# Patient Record
Sex: MALE | Race: WHITE | NOT HISPANIC OR LATINO | Employment: FULL TIME | URBAN - METROPOLITAN AREA
[De-identification: names, ages, dates, MRNs, and addresses within clinical notes are randomized per-mention and may not be internally consistent; named-entity substitution may affect disease eponyms.]

---

## 2018-12-10 NOTE — PROGRESS NOTES
[facility] at [facility]  Consultation H&P  Silverio Ariza  1980  [unfilled]  [unfilled]  VISIT DATE:  12/11/18    PCP: Trung Adan MD  [address] [place], [place] [address]JUAN FITCH  [facility] [address]    IDENTIFICATION: A 38 y.o. male from [place], [place]. Works desk job in construction.     CC:  Chief Complaint   Patient presents with   • Chest Pain   • Edema       PROBLEM LIST:  1. CP  2. HTN  1. Cough on lisinopril  3. HLD  1. 11/21/18  TG 61 HDL 68 LDL 67, on atorvastatin  4. Former tobacco abuse  1. Cessation 3/2018, 2 PPD x 24 years  5. Morbid obesity  6. Hx of pneumonia w pneumothorax when 8 years old, hospitalized for a few months   7. R knee torn meniscus  8. Surgical history:  1. Inguinal hernia repair 2003      Allergies  No Known Allergies    Current Medications    Current Outpatient Medications:   •  aspirin 81 MG tablet, Take 81 mg by mouth Daily., Disp: , Rfl:   •  atorvastatin (LIPITOR) 10 MG tablet, Take 10 mg by mouth Daily., Disp: , Rfl:   •  losartan (COZAAR) 25 MG tablet, Take 25 mg by mouth Daily., Disp: , Rfl:   •  meloxicam (MOBIC) 15 MG tablet, Take 15 mg by mouth Daily., Disp: , Rfl:   •  methylPREDNISolone (MEDROL) 4 MG tablet, Take 4 mg by mouth Daily., Disp: , Rfl:   •  nitroglycerin (NITROSTAT) 0.4 MG SL tablet, Place 0.4 mg under the tongue Every 5 (Five) Minutes As Needed for Chest Pain. Take no more than 3 doses in 15 minutes., Disp: , Rfl:   •  Turmeric 500 MG capsule, Take 1 capsule by mouth Daily., Disp: , Rfl:      History of Present Illness   HPI  This is a 38-year-old male with the above-mentioned PMH who presents for consult for evaluation of CP and HTN.    Pt reports in September he noticed some non radiating substernal chest tightness that occurred during moments of stress. He was at work at rest and states it was not severe and lasted for a few seconds.  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 He denies any associated nausea, diaphoresis, palpitations, presyncope. After that he went to the ED in [place] at which time EKG and cardiac enzymes were normal.      He then established w a PCP and was started on losartan and atorvastatin. His BP at home had been 12-s-140s/80s-90s.  He has not had any further episodes of chest pain.  He was given nitroglycerin at his PCPs but has not had to use it. He has never had a stress test. His wife reports he snores and she has witnessed apneic spells. He has not been evaluated for NUPUR.    Pt denies dyspnea at rest , orthopnea, palpitations, lower extremity edema, or claudication. Pt denies history of CHF, DVT, PE, MI, CVA, TIA, or rheumatic fever. He denies family hx of premature CAD, CVAs, or aneurysms.       ROS  Review of Systems   Cardiovascular: Positive for chest pain and leg swelling.   Respiratory: Positive for cough.    All other systems reviewed and are negative.      SOCIAL HX  Social History     Socioeconomic History   • Marital status:      Spouse name: Not on file   • Number of children: Not on file   • Years of education: Not on file   • Highest education level: Not on file   Social Needs   • Financial resource strain: Not on file   • Food insecurity - worry: Not on file   • Food insecurity - inability: Not on file   • Transportation needs - medical: Not on file   • Transportation needs - non-medical: Not on file   Occupational History   • Not on file   Tobacco Use   • Smoking status: Former Smoker     Types: Cigarettes     Last attempt to quit: 1995     Years since quittin.0   • Smokeless tobacco: Never Used   Substance and Sexual Activity   • Alcohol use: Yes     Frequency: Never   • Drug use: No   • Sexual activity: Defer   Other Topics Concern   • Not on file   Social  "History Narrative   • Not on file       FAMILY HX  Family History   Problem Relation Age of Onset   • No Known Problems Mother    • No Known Problems Father        Vitals:    12/11/18 1334   BP: 136/88   BP Location: Left arm   Patient Position: Sitting   Pulse: 82   SpO2: 96%   Weight: (!) 193 kg (425 lb 3.2 oz)   Height: 190.5 cm (75\")       PHYSICAL EXAMINATION:  Physical Exam   Constitutional: He is oriented to person, place, and time. He appears well-developed and well-nourished. No distress.   obese   HENT:   Head: Normocephalic and atraumatic.   Right Ear: External ear normal.   Left Ear: External ear normal.   Nose: Nose normal.   Eyes: Conjunctivae and EOM are normal.   Neck: Neck supple. No hepatojugular reflux and no JVD present. Carotid bruit is not present. No thyromegaly present.   Cardiovascular: Normal rate, regular rhythm, S1 normal, S2 normal, normal heart sounds, intact distal pulses and normal pulses. Exam reveals no gallop, no distant heart sounds and no midsystolic click.   No murmur heard.  Pulses:       Radial pulses are 2+ on the right side, and 2+ on the left side.        Dorsalis pedis pulses are 2+ on the right side, and 2+ on the left side.        Posterior tibial pulses are 2+ on the right side, and 2+ on the left side.   Pulmonary/Chest: Effort normal and breath sounds normal. No respiratory distress. He has no decreased breath sounds. He has no wheezes. He has no rhonchi. He has no rales.   Abdominal: Soft. Bowel sounds are normal. There is no hepatosplenomegaly. There is no tenderness.   Musculoskeletal: Normal range of motion. He exhibits no edema.   Some varicosities    Neurological: He is alert and oriented to person, place, and time.   No focal deficits.   Skin: Skin is warm and dry. No erythema.   Psychiatric: He has a normal mood and affect. Thought content normal.   Nursing note and vitals reviewed.      Diagnostic Data:    ECG 12 Lead  Date/Time: 12/11/2018 2:12 PM  Performed " by: Pawel Lee MD  Authorized by: Pawel Lee MD   Rhythm: sinus rhythm  BPM: 88  Clinical impression: normal ECG and low voltage          No results found for: CHLPL, TRIG, HDL, LDLDIRECT  No results found for: GLUCOSE, BUN, CREATININE, NA, K, CL, CO2, CREATININE, BUN  No results found for: HGBA1C  No results found for: WBC, HGB, HCT, PLT    ASSESSMENT:   Diagnosis Plan   1. Precordial pain  ECG 12 Lead    Stress Test With Pet Myocardial Perfusion   2. Essential hypertension     3. Mixed hyperlipidemia     4. Class 3 severe obesity due to excess calories without serious comorbidity with body mass index (BMI) of 50.0 to 59.9 in adult (CMS/[facility])  Ambulatory Referral to Bariatric Surgery   5. PND (paroxysmal nocturnal dyspnea)  Ambulatory Referral to Sleep Medicine       PLAN:  1. With chest pain on exertion and risk factors, notably 40 pack year smoking history, we will treat symptoms as anginal equivalent and risk stratify with noninvasive ischemic eval with LexiPet.  2. BP with modest control. Pt counseled that current guidelines recommend BP <130/80. Counseled to continue checking BP at home and if BP persisting >130/80 we could increase his losartan  3. Cont. Statin therapy w atorvastatin  4. Morbid obesity, pt counseled on the benefits of gastric sleeve. Is agreeable to be referred to bariatrics  5. Pt counseled to avoid steroids and limit NSAID use d/t cardiac risk. Counseled additionally on the many adverse effects of steroid use including hyperglycemia, weight gain, joint complications.  6. Probable NUPUR. We will refer to sleep medicine local to pt.    Scribed for Pawel Lee MD by Nan Jones PA-C. 12/11/2018  2:22 PM   IPawel MD, personally performed the services described in this documentation as scribed by the above named individual in my presence, and it is both accurate and complete.  12/11/2018  6:01 PM    Pawel Lee MD, [facility]

## 2018-12-11 ENCOUNTER — CONSULT (OUTPATIENT)
Dept: CARDIOLOGY | Facility: CLINIC | Age: 38
End: 2018-12-11

## 2018-12-11 VITALS
WEIGHT: 315 LBS | DIASTOLIC BLOOD PRESSURE: 88 MMHG | HEART RATE: 82 BPM | BODY MASS INDEX: 39.17 KG/M2 | OXYGEN SATURATION: 96 % | HEIGHT: 75 IN | SYSTOLIC BLOOD PRESSURE: 136 MMHG

## 2018-12-11 DIAGNOSIS — R06.00 PND (PAROXYSMAL NOCTURNAL DYSPNEA): ICD-10-CM

## 2018-12-11 DIAGNOSIS — E66.01 CLASS 3 SEVERE OBESITY DUE TO EXCESS CALORIES WITHOUT SERIOUS COMORBIDITY WITH BODY MASS INDEX (BMI) OF 50.0 TO 59.9 IN ADULT (HCC): ICD-10-CM

## 2018-12-11 DIAGNOSIS — I10 ESSENTIAL HYPERTENSION: ICD-10-CM

## 2018-12-11 DIAGNOSIS — R07.2 PRECORDIAL PAIN: Primary | ICD-10-CM

## 2018-12-11 DIAGNOSIS — E78.2 MIXED HYPERLIPIDEMIA: ICD-10-CM

## 2018-12-11 PROCEDURE — 99244 OFF/OP CNSLTJ NEW/EST MOD 40: CPT | Performed by: INTERNAL MEDICINE

## 2018-12-11 PROCEDURE — 93000 ELECTROCARDIOGRAM COMPLETE: CPT | Performed by: INTERNAL MEDICINE

## 2018-12-11 RX ORDER — MELOXICAM 15 MG/1
15 TABLET ORAL DAILY
COMMUNITY

## 2018-12-11 RX ORDER — VIT C/B6/B5/MAGNESIUM/HERB 173 50-5-6-5MG
1 CAPSULE ORAL DAILY
COMMUNITY

## 2018-12-11 RX ORDER — LOSARTAN POTASSIUM 25 MG/1
25 TABLET ORAL DAILY
COMMUNITY

## 2018-12-11 RX ORDER — METHYLPREDNISOLONE 4 MG/1
4 TABLET ORAL DAILY
COMMUNITY

## 2018-12-11 RX ORDER — ATORVASTATIN CALCIUM 10 MG/1
10 TABLET, FILM COATED ORAL DAILY
COMMUNITY

## 2018-12-11 RX ORDER — NITROGLYCERIN 0.4 MG/1
0.4 TABLET SUBLINGUAL
COMMUNITY

## 2018-12-21 ENCOUNTER — HOSPITAL ENCOUNTER (OUTPATIENT)
Dept: CARDIOLOGY | Facility: HOSPITAL | Age: 38
Discharge: HOME | End: 2018-12-21
Admitting: PHYSICIAN ASSISTANT

## 2018-12-21 DIAGNOSIS — R07.2 PRECORDIAL PAIN: ICD-10-CM

## 2018-12-21 LAB
BH CV STRESS BP STAGE 1: NORMAL
BH CV STRESS BP STAGE 3: NORMAL
BH CV STRESS COMMENTS STAGE 1: NORMAL
BH CV STRESS DOSE REGADENOSON STAGE 1: 0.4
BH CV STRESS DURATION MIN STAGE 1: 1
BH CV STRESS DURATION MIN STAGE 2: 1
BH CV STRESS DURATION MIN STAGE 3: 1
BH CV STRESS DURATION MIN STAGE 4: 1
BH CV STRESS DURATION SEC STAGE 1: 0
BH CV STRESS DURATION SEC STAGE 2: 0
BH CV STRESS DURATION SEC STAGE 3: 0
BH CV STRESS DURATION SEC STAGE 4: 0
BH CV STRESS HR STAGE 1: 105
BH CV STRESS HR STAGE 2: 108
BH CV STRESS HR STAGE 3: 103
BH CV STRESS HR STAGE 4: 101
BH CV STRESS PROTOCOL 1: NORMAL
BH CV STRESS RECOVERY BP: NORMAL MMHG
BH CV STRESS RECOVERY HR: 97 BPM
BH CV STRESS RECOVERY O2: 97 %
BH CV STRESS STAGE 1: 1
BH CV STRESS STAGE 2: 2
BH CV STRESS STAGE 3: 3
BH CV STRESS STAGE 4: 4
MAXIMAL PREDICTED HEART RATE: 180 BPM
PERCENT MAX PREDICTED HR: 65 %
STRESS BASELINE BP: NORMAL MMHG
STRESS BASELINE HR: 90 BPM
STRESS O2 SAT REST: 99 %
STRESS PERCENT HR: 76 %
STRESS POST O2 SAT PEAK: 99 %
STRESS POST PEAK BP: NORMAL MMHG
STRESS POST PEAK HR: 117 BPM
STRESS TARGET HR: 153 BPM

## 2018-12-21 PROCEDURE — 0 RUBIDIUM CHLORIDE: Performed by: PHYSICIAN ASSISTANT

## 2018-12-21 PROCEDURE — 78491 MYOCRD IMG PET 1STD RST/STRS: CPT

## 2018-12-21 PROCEDURE — 25010000002 REGADENOSON 0.4 MG/5ML SOLUTION: Performed by: PHYSICIAN ASSISTANT

## 2018-12-21 PROCEDURE — A9555 RB82 RUBIDIUM: HCPCS | Performed by: PHYSICIAN ASSISTANT

## 2018-12-21 PROCEDURE — 78492 MYOCRD IMG PET MLT RST&STRS: CPT | Performed by: INTERNAL MEDICINE

## 2018-12-21 PROCEDURE — 93018 CV STRESS TEST I&R ONLY: CPT | Performed by: INTERNAL MEDICINE

## 2018-12-21 PROCEDURE — 93017 CV STRESS TEST TRACING ONLY: CPT

## 2018-12-21 RX ADMIN — RUBIDIUM CHLORIDE RB-82 1 DOSE: 150 INJECTION, SOLUTION INTRAVENOUS at 13:28

## 2018-12-21 RX ADMIN — RUBIDIUM CHLORIDE RB-82 1 DOSE: 150 INJECTION, SOLUTION INTRAVENOUS at 13:40

## 2018-12-21 RX ADMIN — REGADENOSON 0.4 MG: 0.08 INJECTION, SOLUTION INTRAVENOUS at 13:39

## 2018-12-26 ENCOUNTER — TELEPHONE (OUTPATIENT)
Dept: CARDIOLOGY | Facility: CLINIC | Age: 38
End: 2018-12-26

## 2018-12-26 NOTE — TELEPHONE ENCOUNTER
Patient called wanting to know about the results of his stress test. Per [facility] report, stress test was within normal limits. There were periods of tachycardia, but no arrhythmias found during test or recovery. EF was WNL during test.     Patient verbalized understanding